# Patient Record
(demographics unavailable — no encounter records)

---

## 2024-10-30 NOTE — DISCUSSION/SUMMARY
[de-identified] : Today I had a lengthy discussion with the patient regarding their right ankle pain. I have addressed all the patient's concerns surrounding the pathology of their condition. At this time, we will continue with conservative management.    I have reviewed the patient's XR imaging with them in great detail. She can utilize lidocaine cream, and wrapping her ankle as needed.  I recommend that the patient utilize Voltaren gel topically. If the Voltaren gel could not be obtained, Icy Hot, Biofreeze, or Bengay can be utilized instead. I recommend that the patient utilize ice, NSAIDS PRN, and heat. They can also elevate their RLE above the level of the heart.  The patient understood and verbally agreed to the treatment plan. All of their questions were answered and they were satisfied with the visit. The patient should call the office if they have any questions or experience worsening symptoms.   Follow up prn.

## 2024-10-30 NOTE — HISTORY OF PRESENT ILLNESS
[FreeTextEntry1] :  10/30/2024: FILIPE CARY is a 67 year old female presenting to the office for a follow up evaluation of he right ankle pain. She reports that her symptoms have worsened since her last visit to the office. Her pain has been radiating up her leg from her right ankle. She is also having significant back pain. She has EDS. The patient presents to the office in sneakers and ambulating without assistance.  11/01/2023: FILIPE CARY is a 66 year old female presenting to the office for a follow up evaluation of right ankle pain. She reports that she is doing well and feels that her symptoms have improved about 90%. She presents to the office with an ACE wrap, sneakers and ambulating without assistance.   09/20/2023: The patient is a 66 year old female presenting to the office for a follow up evaluation of right ankle pain. She reports that she is getting "zinging" electric shocks when she touches her lateral ankle. She believes that her symptoms are progressing and that she has no improvement in her pain. She has not tried Gabapentin or nerve managing medications. She has not yet tried the lidocaine cream. She presents to the office in sneakers and ambulating without assistance. She is accompanied by her .   8/28/23: This is a 66-year-old female who presents for evaluation of her right ankle. Patient has been having right ankle pain for the past 2.5 months. Patient denies trauma or falls. Patient states pain is on the lateral aspect of the ankle and radiates down and upward. Patient states pain is sharp and throbbing in nature. Patient was being treated by a podiatrist who did laser treatment which did not work. To note, patient was diagnosed with EDS 1 year ago. She presents with an MRI as well.  She has not been compliant with physical therapy due to pain.

## 2024-10-30 NOTE — PHYSICAL EXAM
[de-identified] : General: Alert and oriented x3. In no acute distress. Pleasant in nature with a normal affect. No apparent respiratory distress.  Right Ankle Exam  changes dorsum medial right foot more than left  Skin: Clean, dry, intact Inspection: Small nodule laterally, No obvious malalignment, no swelling, no effusion; no lymphadenopathy Pulses: 2+ DP/PT pulses ROM: Right Ankle 0 degrees of dorsiflexion, 40 degrees of plantarflexion, 10 degrees of subtalar motion Tenderness: lateral ankle tenderness over superficial peroneal nerve, No tenderness over the lateral malleolus, no CFL/ATFL/PTFL pain. No medial malleolus pain, no deltoid ligament pain. No proximal fibular pain. No heel pain. Stability: Negative anterior/posterior drawer. Strength: 5/5 TA/GS/EHL Neuro: In tact to light touch throughout Additional tests: Negative Mortons test, Negative syndesmosis squeeze test.  [de-identified] : 5V of the bilateral feet were ordered obtained and reviewed by me today, 10/30/2024 , revealed: no fractures noted

## 2024-10-30 NOTE — ADDENDUM
[FreeTextEntry1] : I, Jorge Restrepo, acted solely as a scribe for Dr. Jorge Mckeon on this date 10/30/2024  .   All medical record entries made by the Scribe were at my, Dr. Jorge Mckeon, direction and personally dictated by me on 10/30/2024 . I have reviewed the chart and agree that the record accurately reflects my personal performance of the history, physical exam, assessment and plan. I have also personally directed, reviewed, and agreed with the chart.

## 2024-11-20 NOTE — HISTORY OF PRESENT ILLNESS
[FreeTextEntry1] : Ms. FILIPE CARY is a 68 year old female with a PMHx of EDS who presents with low back pain.   Location: L low back/buttocks Onset: Chronic, but worsened severely on 11/16/24 just standing up Provocation/Palliative: Worse with activity, better with rest Quality: Sharp, intense Radiation: None Severity: Severe 10/10 Timing: Not improving   Denies any associated numbness. Denies any associated leg weakness. Denies any loss of bowel/bladder control or any groin numbness. Previous medications trialed: Advil, Tylenol without relief, Flexeril without relief Previous procedures relevant to complaint: IM steroid shots without relief Conservative therapy tried?: Chiropractic care, no recent PT

## 2024-11-20 NOTE — ASSESSMENT
[FreeTextEntry1] : Ms. FILIPE CARY is a 68 year old female who presents with severe L sided low back pain that started in mid 11/2024, likely SI joint related vs from underlying spondylosis. Denies any red flag signs but patient is tearful during exam due to pain. Will recommend: - Will obtain MRI L Spine and MR Bony Pelvis to better evaluate cause of pain - Advised patient to go to the ER for immediate imaging/evaluation given severity of pain. Patient refused even after she was warned that pain can get worse. Advised that if she does have any bowel/bladder changes, groin numbness, weakness or other new symptoms that she must immediately go to ER for which she agreed.  - In meantime, will start Methocarbamol 500-1000 mg Qhs PRN. Advised of side effects including sedation.   RTC after imaging. Patient aware of red flag signs including any changes to their bowel/bladder control, groin numbness or new weakness. Patient knows to seek immediate attention by calling 911 or going to nearest ER if these symptoms appear.   This patient is being managed for a complex chronic pain that requires ongoing medical management. The nature of this condition requires a longitudinal relationship and monitoring over time for appropriate treatment.

## 2024-11-20 NOTE — DATA REVIEWED
[FreeTextEntry1] : MRI L and R hips reviewed [facility] 8/2023 reviewed

## 2024-11-20 NOTE — PHYSICAL EXAM
[FreeTextEntry1] : PE: Constitutional: In Moderate distress due to pain, tearful but calm and cooperative MSK (Back)  Inspection: no gross swelling identified  Palpation: Tenderness of the left lower lumbar paraspinals and PSIS area, no TTP over lateral L hip  ROM: Pain at end lumbar extension/flexion, able to flex around 45 degrees without pain  Strength: 5/5 strength in bilateral lower extremities  Reflexes: 2+ Patella reflex bilaterally, 2+ Achilles reflex bilaterally, negative clonus bilaterally  Sensation: Intact to light touch in bilateral lower extremities Special tests: SLR: negative bilaterally BELEN: positive on L

## 2024-11-24 NOTE — PHYSICAL EXAM
[de-identified] : Patient is well nourished, well-developed, in no acute distress, with appropriate mood and affect. The patient is oriented to time, place, and person. Respirations are even and unlabored. Gait evaluation does not reveal a limp. There is no inguinal adenopathy. Examination of the contralateral hip shows normal range of motion, strength, no tenderness, and intact skin. The affected limb is well-perfused and showed 2+ dp/pt pulses, without skin lesions, shows a grossly normal motor and sensory examination. Examination of the hip shows no skin lesions. Hip motion is full and painless from 0-90 degrees extension to flexion, 20 degrees adduction and 20 degrees abduction, and 15 degrees internal and 30 degrees external rotation. Leg lengths are approximately equal. FADIR is negative and BELEN is negative. Stinchfield test is negative. Both hips are stable and muscle strength is normal with good strength with resisted abduction and adduction. Pedal pulses are palpable. [de-identified] :  The patient brings with her an MRI of the left hip.  Reviewed the imaging with the patient was demonstrates a gluteus minimus tear that is moderate.  AP pelvis, AP and lateral hip radiographs of the left hip were ordered and taken in the office and demonstrate no evidence of degenerative joint disease of the hip with maintained joint space and no evidence of fractures or other intraarticular pathology.

## 2024-11-24 NOTE — DISCUSSION/SUMMARY
[de-identified] : This patient has extra-articular left hip pain consistent with a gluteus minimus tear.  The patient is not an appropriate candidate for surgical intervention at this time. An extensive discussion was conducted on the natural history of the disease and the variety of surgical and non-surgical options available to the patient including, but not limited to non-steroidal anti-inflammatory medications, steroid injections, physical therapy, maintenance of ideal body weight, and reduction of activity.  I recommended prescription for meloxicam however due to her medical history she is not allowed to take NSAIDs.  Follow-up with physiatry. The patient will schedule an appointment as needed.

## 2025-03-06 NOTE — HISTORY OF PRESENT ILLNESS
[de-identified] : 3/6/25: Patient presents with right knee pain.  States she has some burning to the anterior aspect of the knee.  There is a sharp pain that radiates down bilateral lower extremities.  Does have some numbness on the lateral aspect of the knee.  Denies buckling.  Not taking anything for this.  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  Relevant PMH: EDS, lymphoma Relevant allergies: Sulfa, codeine AC: Denies Hx of DVT/PE: Denies Nicotine: Denies

## 2025-03-06 NOTE — DISCUSSION/SUMMARY
[de-identified] : Right knee internal derangement  Extensive discussion of the natural history of this issue was had with the patient.  We discussed the treatment options focusing on conservative therapy which includes anti-inflammatories, physical therapy/home exercise, & activity modification.   Prescription for diclofenac gel was sent to the pharmacy. Patient will return if the pain returns or does not resolve.  The patient's current medication management of their orthopedic diagnosis was reviewed today: (1) We discussed a comprehensive treatment plan that included possible pharmaceutical management involving the use of prescription strength medications including but not limited to options such as oral Ibuprofen 400mg QID, once daily Meloxicam 15 mg, or 500-650 mg Tylenol versus over the counter oral medications and topical prescription NSAID Pennsaid vs over the counter Voltaren gel. (2) There is a moderate risk of morbidity with further treatment, especially from use of prescription strength medications and possible side effects of these medications which include upset stomach with oral medications, skin reactions to topical medications and cardiac/renal issues with long term use. (3) I recommended that the patient follow-up with their medical physician to discuss any significant specific potential issues with long term medication use such as interactions with current medications or with exacerbation of underlying medical comorbidities. (4) The benefits and risks associated with use of injectable, oral or topical, prescription and over the counter anti-inflammatory medications were discussed with the patient. The patient voiced understanding of the risks including but not limited to bleeding, stroke, kidney dysfunction, heart disease, and were referred to the black box warning label for further information.

## 2025-03-06 NOTE — DISCUSSION/SUMMARY
[de-identified] : Right knee internal derangement  Extensive discussion of the natural history of this issue was had with the patient.  We discussed the treatment options focusing on conservative therapy which includes anti-inflammatories, physical therapy/home exercise, & activity modification.   Prescription for diclofenac gel was sent to the pharmacy. Patient will return if the pain returns or does not resolve.  The patient's current medication management of their orthopedic diagnosis was reviewed today: (1) We discussed a comprehensive treatment plan that included possible pharmaceutical management involving the use of prescription strength medications including but not limited to options such as oral Ibuprofen 400mg QID, once daily Meloxicam 15 mg, or 500-650 mg Tylenol versus over the counter oral medications and topical prescription NSAID Pennsaid vs over the counter Voltaren gel. (2) There is a moderate risk of morbidity with further treatment, especially from use of prescription strength medications and possible side effects of these medications which include upset stomach with oral medications, skin reactions to topical medications and cardiac/renal issues with long term use. (3) I recommended that the patient follow-up with their medical physician to discuss any significant specific potential issues with long term medication use such as interactions with current medications or with exacerbation of underlying medical comorbidities. (4) The benefits and risks associated with use of injectable, oral or topical, prescription and over the counter anti-inflammatory medications were discussed with the patient. The patient voiced understanding of the risks including but not limited to bleeding, stroke, kidney dysfunction, heart disease, and were referred to the black box warning label for further information.

## 2025-03-06 NOTE — PHYSICAL EXAM
[de-identified] : Right lower extremity Hip: Normal ROM without pain on IR/ER Knee Inspection: no effusion, no erythema. Wounds: None. Alignment: neutral. Palpation: Tender to palpation anterior lateral joint line ROM active (in degrees): 0-140 Ligamentous laxity: Stable to varus stress test, stable to valgus stress test Meniscal Test: Mildly positive McMurrays, mildly positive Rainer. Muscle Test: good quad strength. [de-identified] : 3/6/25: Right knee xrays, standing AP/Lateral and Merchant films, and 45 degree PA standing view taken today in the office are reviewed and demonstrate preserved joint space, no abnormalities

## 2025-03-06 NOTE — HISTORY OF PRESENT ILLNESS
[de-identified] : 3/6/25: Patient presents with right knee pain.  States she has some burning to the anterior aspect of the knee.  There is a sharp pain that radiates down bilateral lower extremities.  Does have some numbness on the lateral aspect of the knee.  Denies buckling.  Not taking anything for this.  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  Relevant PMH: EDS, lymphoma Relevant allergies: Sulfa, codeine AC: Denies Hx of DVT/PE: Denies Nicotine: Denies

## 2025-03-06 NOTE — PHYSICAL EXAM
[de-identified] : Right lower extremity Hip: Normal ROM without pain on IR/ER Knee Inspection: no effusion, no erythema. Wounds: None. Alignment: neutral. Palpation: Tender to palpation anterior lateral joint line ROM active (in degrees): 0-140 Ligamentous laxity: Stable to varus stress test, stable to valgus stress test Meniscal Test: Mildly positive McMurrays, mildly positive Rainer. Muscle Test: good quad strength. [de-identified] : 3/6/25: Right knee xrays, standing AP/Lateral and Merchant films, and 45 degree PA standing view taken today in the office are reviewed and demonstrate preserved joint space, no abnormalities